# Patient Record
Sex: FEMALE | Race: WHITE
[De-identification: names, ages, dates, MRNs, and addresses within clinical notes are randomized per-mention and may not be internally consistent; named-entity substitution may affect disease eponyms.]

---

## 2018-06-17 ENCOUNTER — HOSPITAL ENCOUNTER (EMERGENCY)
Dept: HOSPITAL 75 - ER | Age: 83
Discharge: HOME | End: 2018-06-17
Payer: MEDICARE

## 2018-06-17 VITALS — HEIGHT: 61 IN | WEIGHT: 120 LBS | BODY MASS INDEX: 22.66 KG/M2

## 2018-06-17 VITALS — DIASTOLIC BLOOD PRESSURE: 89 MMHG | SYSTOLIC BLOOD PRESSURE: 170 MMHG

## 2018-06-17 VITALS — SYSTOLIC BLOOD PRESSURE: 129 MMHG | DIASTOLIC BLOOD PRESSURE: 72 MMHG

## 2018-06-17 DIAGNOSIS — Z79.82: ICD-10-CM

## 2018-06-17 DIAGNOSIS — Z88.4: ICD-10-CM

## 2018-06-17 DIAGNOSIS — N39.0: ICD-10-CM

## 2018-06-17 DIAGNOSIS — G62.9: Primary | ICD-10-CM

## 2018-06-17 DIAGNOSIS — F02.80: ICD-10-CM

## 2018-06-17 DIAGNOSIS — Z66: ICD-10-CM

## 2018-06-17 DIAGNOSIS — Z98.890: ICD-10-CM

## 2018-06-17 DIAGNOSIS — G30.9: ICD-10-CM

## 2018-06-17 LAB
ALBUMIN SERPL-MCNC: 4.2 GM/DL (ref 3.2–4.5)
ALP SERPL-CCNC: 78 U/L (ref 40–136)
ALT SERPL-CCNC: 17 U/L (ref 0–55)
APTT PPP: YELLOW S
BACTERIA #/AREA URNS HPF: (no result) /HPF
BASOPHILS # BLD AUTO: 0 10^3/UL (ref 0–0.1)
BASOPHILS NFR BLD AUTO: 0 % (ref 0–10)
BILIRUB SERPL-MCNC: 0.5 MG/DL (ref 0.1–1)
BILIRUB UR QL STRIP: NEGATIVE
BUN/CREAT SERPL: 37
CALCIUM SERPL-MCNC: 9.4 MG/DL (ref 8.5–10.1)
CHLORIDE SERPL-SCNC: 104 MMOL/L (ref 98–107)
CO2 SERPL-SCNC: 22 MMOL/L (ref 21–32)
CREAT SERPL-MCNC: 0.73 MG/DL (ref 0.6–1.3)
EOSINOPHIL # BLD AUTO: 0.1 10^3/UL (ref 0–0.3)
EOSINOPHIL NFR BLD AUTO: 1 % (ref 0–10)
ERYTHROCYTE [DISTWIDTH] IN BLOOD BY AUTOMATED COUNT: 12.8 % (ref 10–14.5)
FIBRINOGEN PPP-MCNC: (no result) MG/DL
GFR SERPLBLD BASED ON 1.73 SQ M-ARVRAT: > 60 ML/MIN
GLUCOSE SERPL-MCNC: 147 MG/DL (ref 70–105)
GLUCOSE UR STRIP-MCNC: NEGATIVE MG/DL
HCT VFR BLD CALC: 41 % (ref 35–52)
HGB BLD-MCNC: 13.6 G/DL (ref 11.5–16)
INR PPP: 1 (ref 0.8–1.4)
KETONES UR QL STRIP: NEGATIVE
LEUKOCYTE ESTERASE UR QL STRIP: NEGATIVE
LYMPHOCYTES # BLD AUTO: 1.8 X 10^3 (ref 1–4)
LYMPHOCYTES NFR BLD AUTO: 14 % (ref 12–44)
MANUAL DIFFERENTIAL PERFORMED BLD QL: NO
MCH RBC QN AUTO: 31 PG (ref 25–34)
MCHC RBC AUTO-ENTMCNC: 33 G/DL (ref 32–36)
MCV RBC AUTO: 95 FL (ref 80–99)
MONOCYTES # BLD AUTO: 0.7 X 10^3 (ref 0–1)
MONOCYTES NFR BLD AUTO: 5 % (ref 0–12)
NEUTROPHILS # BLD AUTO: 9.8 X 10^3 (ref 1.8–7.8)
NEUTROPHILS NFR BLD AUTO: 79 % (ref 42–75)
NITRITE UR QL STRIP: POSITIVE
PH UR STRIP: 6 [PH] (ref 5–9)
PLATELET # BLD: 265 10^3/UL (ref 130–400)
PMV BLD AUTO: 11 FL (ref 7.4–10.4)
POTASSIUM SERPL-SCNC: 4.2 MMOL/L (ref 3.6–5)
PROT SERPL-MCNC: 7 GM/DL (ref 6.4–8.2)
PROT UR QL STRIP: (no result)
PROTHROMBIN TIME: 12.9 SEC (ref 12.2–14.7)
RBC # BLD AUTO: 4.36 10^6/UL (ref 4.35–5.85)
RBC #/AREA URNS HPF: (no result) /HPF
SODIUM SERPL-SCNC: 138 MMOL/L (ref 135–145)
SP GR UR STRIP: 1.02 (ref 1.02–1.02)
SQUAMOUS #/AREA URNS HPF: (no result) /HPF
UROBILINOGEN UR-MCNC: 1 MG/DL
WBC # BLD AUTO: 12.4 10^3/UL (ref 4.3–11)
WBC #/AREA URNS HPF: (no result) /HPF

## 2018-06-17 PROCEDURE — 70450 CT HEAD/BRAIN W/O DYE: CPT

## 2018-06-17 PROCEDURE — 93005 ELECTROCARDIOGRAM TRACING: CPT

## 2018-06-17 PROCEDURE — 96365 THER/PROPH/DIAG IV INF INIT: CPT

## 2018-06-17 PROCEDURE — 85610 PROTHROMBIN TIME: CPT

## 2018-06-17 PROCEDURE — 80053 COMPREHEN METABOLIC PANEL: CPT

## 2018-06-17 PROCEDURE — 36415 COLL VENOUS BLD VENIPUNCTURE: CPT

## 2018-06-17 PROCEDURE — 85025 COMPLETE CBC W/AUTO DIFF WBC: CPT

## 2018-06-17 PROCEDURE — 82962 GLUCOSE BLOOD TEST: CPT

## 2018-06-17 PROCEDURE — 71045 X-RAY EXAM CHEST 1 VIEW: CPT

## 2018-06-17 PROCEDURE — 84484 ASSAY OF TROPONIN QUANT: CPT

## 2018-06-17 PROCEDURE — 87186 SC STD MICRODIL/AGAR DIL: CPT

## 2018-06-17 PROCEDURE — 87088 URINE BACTERIA CULTURE: CPT

## 2018-06-17 PROCEDURE — 81000 URINALYSIS NONAUTO W/SCOPE: CPT

## 2018-06-17 NOTE — XMS REPORT
Continuity of Care Document

 Created on: 2018



TARSHA FLORES

External Reference #: [id number]

: 1933

Sex: Female



Demographics







 Address  [address] [place]  [address]

 

 Home Phone  [phone] x

 

 Preferred Language  Unknown

 

 Marital Status  Unknown

 

 Roman Catholic Affiliation  Unknown

 

 Race  Unknown

 

 Ethnic Group  Unknown





Author







 Author  [facility]

 

 Organization  [facility]

 

 Address  Unknown

 

 Phone  Unavailable



              



Allergies

      



There is no data.                  



Medications

      



There is no data.                  



Problems

      



There is no data.                  



Procedures

      



There is no data.                  



Results

      



There is no data.              



Encounters

      





 ACCT No.            Visit Date/Time            Discharge            Status    
        Pt. Type            Provider            Facility            Loc./Unit  
          Complaint        

 

 [id number]            2013 15:38:00            2013 23:59:59    
        [facility]            Outpatient

## 2018-06-17 NOTE — DIAGNOSTIC IMAGING REPORT
Indication: Altered mental status, seizure like episode.



Discussion: Single portable supine view of the chest was

obtained. The patient's hand is overlying the right chest which

does limit evaluation. Normal heart size. Elevated right

hemidiaphragm. No focal consolidation, pleural fluid, or

pneumothorax.



Impression:

1. Negative limited chest.



Dictated by: 



  Dictated on workstation # [id number]

## 2018-06-17 NOTE — ED NEUROLOGICAL PROBLEM
General


Chief Complaint:  Neurological Problems


Stated Complaint:  SEIZURE


Nursing Triage Note:  


pt brought in by ems with complaint of possible seizure. per NH pt was in 


wheelchair and started "convulsing". the aid taking care of pt was unsure 


whether it was seizure or not. pt does not have have known hx of seizures.


Nursing Sepsis Screen:  No Definite Risk


Source:  EMS


Exam Limitations:  clinical condition





History of Present Illness


Date Seen by Provider:  Jun 17, 2018


Time Seen by Provider:  06:26


Initial Comments


84-year-old white female presents from nursing home after possible seizure from 

the nursing home.  The nursing home reported the patient is non-verbal and has 

contractures.  The patient's blood pressure was elevated earlier this morning.  

According to the nursing home records the patient has no history of seizure 

disorder.





Patient's family is here and confirms that the patient is a DO NOT RESUSCITATE.

  They want the patient to have comfort care only.  Patient is under the care 

of Dr. FLORES.





Allergies and Home Medications


Allergies


Coded Allergies:  


     NKANo Known Allergies (Verified  Allergy, Unknown, 8/8/08)


     midazolam (Unverified  Allergy, Unknown, 1/22/11)





Home Medications


Acetaminophen 325 Mg Tablet, 1-2 TAB PO PRN, (Reported)


   PAIN OR FEVER 


Aspirin 81 Mg Tabec, 81 MG PO DAILY, (Reported)


Calcitonin 3.7 Ml [place].pump, 1 SPRAY NS DAILY, (Reported)


Cholecalciferol 2,000 Unit Capsule, 2,000 UNIT PO DAILY, (Reported)


Dipyridamole/Aspirin 1 Ea Cap, 1 EA PO DAILY, (Reported)


Docusate Sodium 100 Mg Capsule, 100 MG PO HS, (Reported)


Levothyroxine Sodium 25 Mcg Tablet, 1 TAB PO DAILY, (Reported)


Levothyroxine Sodium 50 Mcg Tablet, 1 TAB PO DAILY, (Reported)


Metoclopramide Hcl 5 Mg Tablet, 5 MG PO ACHS, (Reported)


Omeprazole 20 Mg Capsule.dr, 20 MG PO DAILY, (Reported)


Potassium Chloride 8 Meq Capsule.sa, 8 MEQ PO DAILY, (Reported)


Senna 1 Ea Tablet, 1 EA PO BID, (Reported)


Simvastatin 20 Mg Tablet, 20 MG PO DAILY, (Reported)


Sitagliptin Phosphate 100 Mg Tablet, 1 EACH PO DAILY, (Reported)


Tramadol Hcl 50 Mg Tablet, 50 MG PO TID, (Reported)





Patient Home Medication List


Home Medication List Reviewed:  Yes





Review of Systems


Constitutional:  no symptoms reported


Eyes:  No Symptoms Reported


Ears, Nose, Mouth, Throat:  no symptoms reported


Respiratory:  no symptoms reported


Cardiovascular:  no symptoms reported


Genitourinary:  no symptoms reported


Pregnant:  No


Musculoskeletal:  no symptoms reported, other (chronic contractures)


Skin:  no symptoms reported


Psychiatric/Neurological:  See HPI, Other (previous strokes)


Endocrine:  No Symptoms Reported


Hematologic/Lymphatic:  No Symptoms Reported





Past Medical-Social-Family Hx


Past Med/Social Hx:  Reviewed Nursing Past Med/Soc Hx


Patient Social History


Alcohol Use:  Denies Use


Recreational Drug Use:  No


Smoking Status:  Never a Smoker


Recent Foreign Travel:  No


Contact w/Someone Who Travel:  No


Recent Infectious Disease Expo:  No


Recent Hopitalizations:  Yes





Immunizations Up To Date


Tetanus Booster (TDap):  Unknown


PED Vaccines UTD:  Yes


Date of Pneumonia Vaccine:  Oct 29, 2008


Date of Influenza Vaccine:  Oct 29, 2011





Past Medical History


Surgeries:  Yes


Respiratory:  No


Cardiac:  No


Neurological:  Yes


Reproductive Disorders:  No


Gastrointestinal:  No


Musculoskeletal:  Yes (INGUINAL HERNIA REPAIR RIGHT SIDE)


Endocrine:  Yes


Psychosocial:  Yes (ALZHIEMERS)


Blood Disorders:  No





Physical Exam


Vital Signs





Vital Signs - First Documented








 6/17/18





 06:10


 


Temp 98.5


 


Pulse 88


 


Resp 20


 


B/P (MAP) 107/


 


Pulse Ox 96


 


O2 Delivery Room Air


 


O2 Flow Rate 150.00





Capillary Refill : Less Than 3 Seconds


General Appearance:  no apparent distress, other (the patient is nonverbal and 

has contractures from her previous strokes.)


HEENT:  other (there may be some old dried blood on the lips.  I was not 

possible to do an adequate oral exam on the patient.  No active bleeding was 

noted during her evaluation in the emergency department.)


Neck:  other (the patient's neck is nontender but it is stiff as is the rest of 

her back and extremities from contractures.)


Respiratory:  lungs clear, normal breath sounds


Cardiovascular:  normal peripheral pulses, regular rate, rhythm


Gastrointestinal:  normal bowel sounds, non tender, soft


Genital/Rectal:  other (there is a normal external genital exam.  Catheter 

urine was obtained which was cloudy.  The rectal exam was normal.  The patient'

s rectal exam was normal.)


Back:  other (2 small bruises were noted over the posterior pelvis over the SI 

joints.  These were old in approximately the size of quarters.)


Extremities:  other (4 extremity contractures)


Neurologic/Psychiatric:  other (patient's neurological exam was very limited.  

However the patient was awake and responded to verbal stimuli.  She was 

nonverbal.)


Skin:  normal color, warm/dry





Progress/Results/Core Measures


Results/Orders


Lab Results





Laboratory Tests








Test


 6/17/18


06:19 6/17/18


06:52 Range/Units


 


 


Glucometer 134 H    MG/DL


 


White Blood Count


 


 12.4 H


 4.3-11.0


10^3/uL


 


Red Blood Count


 


 4.36 


 4.35-5.85


10^6/uL


 


Hemoglobin  13.6  11.5-16.0  G/DL


 


Hematocrit  41  35-52  %


 


Mean Corpuscular Volume  95  80-99  FL


 


Mean Corpuscular Hemoglobin  31  25-34  PG


 


Mean Corpuscular Hemoglobin


Concent 


 33 


 32-36  G/DL





 


Red Cell Distribution Width  12.8  10.0-14.5  %


 


Platelet Count


 


 265 


 130-400


10^3/uL


 


Mean Platelet Volume  11.0 H 7.4-10.4  FL


 


Neutrophils (%) (Auto)  79 H 42-75  %


 


Lymphocytes (%) (Auto)  14  12-44  %


 


Monocytes (%) (Auto)  5  0-12  %


 


Eosinophils (%) (Auto)  1  0-10  %


 


Basophils (%) (Auto)  0  0-10  %


 


Neutrophils # (Auto)  9.8 H 1.8-7.8  X 10^3


 


Lymphocytes # (Auto)  1.8  1.0-4.0  X 10^3


 


Monocytes # (Auto)  0.7  0.0-1.0  X 10^3


 


Eosinophils # (Auto)


 


 0.1 


 0.0-0.3


10^3/uL


 


Basophils # (Auto)


 


 0.0 


 0.0-0.1


10^3/uL


 


Prothrombin Time  12.9  12.2-14.7  SEC


 


INR Comment  1.0  0.8-1.4  


 


Urine Color  YELLOW   


 


Urine Clarity  VERY CLOUDY H  


 


Urine pH  6  5-9  


 


Urine Specific Gravity  1.025 H 1.016-1.022  


 


Urine Protein  2+ H NEGATIVE  


 


Urine Glucose (UA)  NEGATIVE  NEGATIVE  


 


Urine Ketones  NEGATIVE  NEGATIVE  


 


Urine Nitrite  POSITIVE H NEGATIVE  


 


Urine Bilirubin  NEGATIVE  NEGATIVE  


 


Urine Urobilinogen  1  NORMAL  MG/DL


 


Urine Leukocyte Esterase  NEGATIVE  NEGATIVE  


 


Urine RBC (Auto)  3+ H NEGATIVE  


 


Urine RBC  RARE   /HPF


 


Urine WBC  RARE   /HPF


 


Urine Squamous Epithelial


Cells 


 5-10 


  /HPF





 


Urine Crystals  NONE   /LPF


 


Urine Bacteria  LARGE H  /HPF


 


Urine Casts  NONE   /LPF


 


Urine Mucus  NEGATIVE   /LPF


 


Urine Culture Indicated  YES   


 


Sodium Level  138  135-145  MMOL/L


 


Potassium Level  4.2  3.6-5.0  MMOL/L


 


Chloride Level  104    MMOL/L


 


Carbon Dioxide Level  22  21-32  MMOL/L


 


Anion Gap  12  5-14  MMOL/L


 


Blood Urea Nitrogen  27 H 7-18  MG/DL


 


Creatinine


 


 0.73 


 0.60-1.30


MG/DL


 


Estimat Glomerular Filtration


Rate 


 > 60 


  





 


BUN/Creatinine Ratio  37   


 


Glucose Level  147 H   MG/DL


 


Calcium Level  9.4  8.5-10.1  MG/DL


 


Total Bilirubin  0.5  0.1-1.0  MG/DL


 


Aspartate Amino Transf


(AST/SGOT) 


 22 


 5-34  U/L





 


Alanine Aminotransferase


(ALT/SGPT) 


 17 


 0-55  U/L





 


Alkaline Phosphatase  78    U/L


 


Troponin I  < 0.30  <0.30  NG/ML


 


Total Protein  7.0  6.4-8.2  GM/DL


 


Albumin  4.2  3.2-4.5  GM/DL








My Orders





Orders - YAQUELIN MELGAR MD


Ct Head Wo (6/17/18 06:22)


Cbc With Automated Diff (6/17/18 06:22)


Comprehensive Metabolic Panel (6/17/18 06:22)


Ua Culture If Indicated (6/17/18 06:22)


Protime With Inr (6/17/18 06:22)


Ekg Tracing (6/17/18 06:22)


Troponin I (6/17/18 06:22)


Midazolam Injection (Versed Injection) (6/17/18 06:49)


Chest 1 View, Ap/Pa Only (6/17/18 06:50)


Urine Culture (6/17/18 06:52)


Ceftriaxone Injection (Rocephin Injectio (6/17/18 08:45)





Vital Signs/I&O











 6/17/18 6/17/18





 06:10 08:02


 


Temp 98.5 


 


Pulse 88 77


 


Resp 20 18


 


B/P (MAP) 107/ 170/89 (116)


 


Pulse Ox 96 97


 


O2 Delivery Room Air Room Air


 


O2 Flow Rate 150.00 











Progress


Progress Note :  


   Time:  08:48


Progress Note


The patient's workup in the emergency department demonstrated an unremarkable 

CT for acute pathology.  The patient's chest x-ray was grossly normal.  Patient'

s laboratory evaluation demonstrated evidence of an acute UTI.





I discussed treatment options with the family.  It was their wish the patient 

be allowed to return to the nursing home and she will be more comfortable there.





2 g of Rocephin were given IV.  I'll write a prescription for Macrobid for the 

patient.





The patient has recently started Ultram for pain.  I recommended we stop the 

Ultram until Dr. FLORES had a chance to consider whether to continue it.





Departure


Impression





 Primary Impression:  


 UTI (urinary tract infection)


 Qualified Codes:  N30.00 - Acute cystitis without hematuria


 Additional Impression:  


 Seizure


Disposition:  01 HOME, SELF-CARE


Condition:  Improved





Departure-Patient Inst.


Decision time for Depature:  08:52


Referrals:  


FARRAH FLORES DO (PCP/Family)


Primary Care Physician


Patient Instructions:  Acute Cystitis (DC), Epilepsy in Adults





Add. Discharge Instructions:  


Macrobid as prescribed.  Close follow-up Dr. Flores.  Stop Ultram until 

cleared by Dr. Flores.  Return if any problems.  All discharge instructions 

reviewed with patient and/or family. Voiced understanding.











YAQUELIN MELGAR MD Jun 17, 2018 06:49

## 2018-06-17 NOTE — DIAGNOSTIC IMAGING REPORT
PROCEDURE: CT head without contrast.



TECHNIQUE: Multiple contiguous axial images were obtained through

the brain without the use of intravenous contrast.



INDICATION:  Seizure



COMPARISON: 01/20/2011



FINDINGS: The ventricles and cortical sulci are diffusely

prominent. There is no midline shift or mass effect identified.

There is no extra axial or intraparenchymal hemorrhage seen.

Focal areas of decreased attenuation are seen in the subcortical

and periventricular white matter. These likely represent chronic

small vessel ischemic changes. Small lacunar infarct is noted in

the right basal ganglia. No CT evidence of acute territorial

ischemia seen.



The bony calvarium is intact. The paranasal sinuses are clear. 



IMPRESSION:

1. No acute intracranial hemorrhage. No CT evidence of acute

territorial ischemia.

2. Findings of chronic small vessel ischemic disease.  

3. Marked generalized parenchymal volume loss.



Dictated by: 



  Dictated on workstation # [id number]

## 2018-06-19 ENCOUNTER — HOSPITAL ENCOUNTER (EMERGENCY)
Dept: HOSPITAL 75 - ER | Age: 83
Discharge: HOME | End: 2018-06-19
Payer: MEDICARE

## 2018-06-19 VITALS — DIASTOLIC BLOOD PRESSURE: 77 MMHG | SYSTOLIC BLOOD PRESSURE: 142 MMHG

## 2018-06-19 VITALS — WEIGHT: 120.03 LBS | HEIGHT: 61 IN | BODY MASS INDEX: 22.66 KG/M2

## 2018-06-19 DIAGNOSIS — K05.6: ICD-10-CM

## 2018-06-19 DIAGNOSIS — R45.1: Primary | ICD-10-CM

## 2018-06-19 DIAGNOSIS — F02.80: ICD-10-CM

## 2018-06-19 DIAGNOSIS — Z88.4: ICD-10-CM

## 2018-06-19 DIAGNOSIS — G30.9: ICD-10-CM

## 2018-06-19 DIAGNOSIS — Z87.19: ICD-10-CM

## 2018-06-19 DIAGNOSIS — Z79.82: ICD-10-CM

## 2018-06-19 LAB
BASOPHILS # BLD AUTO: 0 10^3/UL (ref 0–0.1)
BASOPHILS NFR BLD AUTO: 0 % (ref 0–10)
BUN/CREAT SERPL: 24
CALCIUM SERPL-MCNC: 9.5 MG/DL (ref 8.5–10.1)
CHLORIDE SERPL-SCNC: 108 MMOL/L (ref 98–107)
CO2 SERPL-SCNC: 23 MMOL/L (ref 21–32)
CREAT SERPL-MCNC: 0.74 MG/DL (ref 0.6–1.3)
EOSINOPHIL # BLD AUTO: 0.1 10^3/UL (ref 0–0.3)
EOSINOPHIL NFR BLD AUTO: 1 % (ref 0–10)
ERYTHROCYTE [DISTWIDTH] IN BLOOD BY AUTOMATED COUNT: 12.7 % (ref 10–14.5)
GFR SERPLBLD BASED ON 1.73 SQ M-ARVRAT: > 60 ML/MIN
GLUCOSE SERPL-MCNC: 138 MG/DL (ref 70–105)
HCT VFR BLD CALC: 39 % (ref 35–52)
HGB BLD-MCNC: 12.8 G/DL (ref 11.5–16)
LYMPHOCYTES # BLD AUTO: 1.3 X 10^3 (ref 1–4)
LYMPHOCYTES NFR BLD AUTO: 12 % (ref 12–44)
MANUAL DIFFERENTIAL PERFORMED BLD QL: NO
MCH RBC QN AUTO: 31 PG (ref 25–34)
MCHC RBC AUTO-ENTMCNC: 33 G/DL (ref 32–36)
MCV RBC AUTO: 93 FL (ref 80–99)
MONOCYTES # BLD AUTO: 1.2 X 10^3 (ref 0–1)
MONOCYTES NFR BLD AUTO: 10 % (ref 0–12)
NEUTROPHILS # BLD AUTO: 8.9 X 10^3 (ref 1.8–7.8)
NEUTROPHILS NFR BLD AUTO: 78 % (ref 42–75)
PLATELET # BLD: 211 10^3/UL (ref 130–400)
PMV BLD AUTO: 11.2 FL (ref 7.4–10.4)
POTASSIUM SERPL-SCNC: 3.7 MMOL/L (ref 3.6–5)
RBC # BLD AUTO: 4.12 10^6/UL (ref 4.35–5.85)
SODIUM SERPL-SCNC: 142 MMOL/L (ref 135–145)
T4 FREE SERPL-MCNC: 1.32 NG/DL (ref 0.7–1.48)
WBC # BLD AUTO: 11.5 10^3/UL (ref 4.3–11)

## 2018-06-19 PROCEDURE — 84439 ASSAY OF FREE THYROXINE: CPT

## 2018-06-19 PROCEDURE — 85025 COMPLETE CBC W/AUTO DIFF WBC: CPT

## 2018-06-19 PROCEDURE — 84443 ASSAY THYROID STIM HORMONE: CPT

## 2018-06-19 PROCEDURE — 36415 COLL VENOUS BLD VENIPUNCTURE: CPT

## 2018-06-19 PROCEDURE — 80048 BASIC METABOLIC PNL TOTAL CA: CPT

## 2018-06-19 PROCEDURE — 96374 THER/PROPH/DIAG INJ IV PUSH: CPT

## 2018-06-19 NOTE — XMS REPORT
Continuity of Care Document

 Created on: 2018



TARSHA FLORES

External Reference #: [id number]

: 1933

Sex: Female



Demographics







 Address  [address] [place]  [address]

 

 Home Phone  [phone] x

 

 Preferred Language  Unknown

 

 Marital Status  Unknown

 

 Congregational Affiliation  Unknown

 

 Race  Unknown

 

 Ethnic Group  Unknown





Author







 Author  [facility]

 

 Organization  [facility]

 

 Address  Unknown

 

 Phone  Unavailable



              



Allergies

      



There is no data.                  



Medications

      



There is no data.                  



Problems

      



There is no data.                  



Procedures

      



There is no data.                  



Results

      





 Test            Result            Range        









 Capillary blood glucose measurement by glucometer (mass/volume) - 18 06:
19         









 Capillary blood glucose measurement by glucometer (mass/volume)            134 
mg/dL                    









 Complete blood count (CBC) with automated white blood cell (WBC) differential 
- 18 06:52         









 Blood leukocytes automated count (number/volume)            12.4 10*3/uL      
      4.3-11.0        

 

 Blood erythrocytes automated count (number/volume)            4.36 10*6/uL    
        4.35-5.85        

 

 Venous blood hemoglobin measurement (mass/volume)            13.6 g/dL        
    11.5-16.0        

 

 Blood hematocrit (volume fraction)            41 %            35-52        

 

 Automated erythrocyte mean corpuscular volume            95 [foz_us]          
  80-99        

 

 Automated erythrocyte mean corpuscular hemoglobin (mass per erythrocyte)      
      31 pg            25-34        

 

 Automated erythrocyte mean corpuscular hemoglobin concentration measurement (
mass/volume)            33 g/dL            32-36        

 

 Automated erythrocyte distribution width ratio            12.8 %            
10.0-14.5        

 

 Automated blood platelet count (count/volume)            265 10*3/uL          
  130-400        

 

 Automated blood platelet mean volume measurement            11.0 [foz_us]     
       7.4-10.4        

 

 Automated blood neutrophils/100 leukocytes            79 %            42-75   
     

 

 Automated blood lymphocytes/100 leukocytes            14 %            12-44   
     

 

 Blood monocytes/100 leukocytes            5 %            0-12        

 

 Automated blood eosinophils/100 leukocytes            1 %            0-10     
   

 

 Automated blood basophils/100 leukocytes            0 %            0-10        

 

 Blood neutrophils automated count (number/volume)            9.8 10*3         
   1.8-7.8        

 

 Blood lymphocytes automated count (number/volume)            1.8 10*3         
   1.0-4.0        

 

 Blood monocytes automated count (number/volume)            0.7 10*3            
0.0-1.0        

 

 Automated eosinophil count            0.1 10*3/uL            0.0-0.3        

 

 Automated blood basophil count (count/volume)            0.0 10*3/uL          
  0.0-0.1        









 Comprehensive metabolic panel - 18 06:52         









 Serum or plasma sodium measurement (moles/volume)            138 mmol/L       
     135-145        

 

 Serum or plasma potassium measurement (moles/volume)            4.2 mmol/L    
        3.6-5.0        

 

 Serum or plasma chloride measurement (moles/volume)            104 mmol/L     
               

 

 Carbon dioxide            22 mmol/L            21-32        

 

 Serum or plasma anion gap determination (moles/volume)            12 mmol/L   
         5-14        

 

 Serum or plasma urea nitrogen measurement (mass/volume)            27 mg/dL   
         7-18        

 

 Serum or plasma creatinine measurement (mass/volume)            0.73 mg/dL    
        0.60-1.30        

 

 Serum or plasma urea nitrogen/creatinine mass ratio            37             
NRG        

 

 Serum or plasma creatinine measurement with calculation of estimated 
glomerular filtration rate            >             NRG        

 

 Serum or plasma glucose measurement (mass/volume)            147 mg/dL        
            

 

 Serum or plasma calcium measurement (mass/volume)            9.4 mg/dL        
    8.5-10.1        

 

 Serum or plasma total bilirubin measurement (mass/volume)            0.5 mg/dL
            0.1-1.0        

 

 Serum or plasma alkaline phosphatase measurement (enzymatic activity/volume)  
          78 U/L                    

 

 Serum or plasma aspartate aminotransferase measurement (enzymatic activity/
volume)            22 U/L            5-34        

 

 Serum or plasma alanine aminotransferase measurement (enzymatic activity/volume
)            17 U/L            0-55        

 

 Serum or plasma protein measurement (mass/volume)            7.0 g/dL         
   6.4-8.2        

 

 Serum or plasma albumin measurement (mass/volume)            4.2 g/dL         
   3.2-4.5        









 Serum or plasma troponin i.cardiac measurement (mass/volume) - 18 06:52 
        









 Serum or plasma troponin i.cardiac measurement (mass/volume)            < ng/
mL            <0.30        









 Complete urinalysis with reflex to culture - 18 06:52         









 Urine color determination            YELLOW             NRG        

 

 Urine clarity determination            VERY CLOUDY             NRG        

 

 Urine pH measurement by test strip            6             5-9        

 

 Specific gravity of urine by test strip            1.025             1.016-
1.022        

 

 Urine protein assay by test strip, semi-quantitative            2+             
NEGATIVE        

 

 Urine glucose detection by automated test strip            NEGATIVE           
  NEGATIVE        

 

 Erythrocytes detection in urine sediment by light microscopy            3+    
         NEGATIVE        

 

 Urine ketones detection by automated test strip            NEGATIVE           
  NEGATIVE        

 

 Urine nitrite detection by test strip            POSITIVE             NEGATIVE
        

 

 Urine total bilirubin detection by test strip            NEGATIVE             
NEGATIVE        

 

 Urine urobilinogen measurement by automated test strip (mass/volume)          
  1 mg/dL            NORMAL        

 

 Urine leukocyte esterase detection by dipstick            NEGATIVE             
NEGATIVE        

 

 Automated urine sediment erythrocyte count by microscopy (number/high power 
field)            RARE             NRG        

 

 Automated urine sediment leukocyte count by microscopy (number/high power field
)            RARE             NRG        

 

 Bacteria detection in urine sediment by light microscopy            LARGE     
        NRG        

 

 Squamous epithelial cells detection in urine sediment by light microscopy     
       5-10             NRG        

 

 Crystals detection in urine sediment by light microscopy            NONE      
       NRG        

 

 Casts detection in urine sediment by light microscopy            NONE         
    NRG        

 

 Mucus detection in urine sediment by light microscopy            NEGATIVE     
        NRG        

 

 Complete urinalysis with reflex to culture            YES             NRG     
   









 PT panel in platelet poor plasma by coagulation assay - 18 06:52         









 Prothrombin time (PT) in platelet poor plasma by coagulation assay            
12.9 s            12.2-14.7        

 

 INR in platelet poor plasma or blood by coagulation assay            1.0      
       0.8-1.4        









 Bacterial urine culture - 18 06:52         









 Bacterial urine culture            [id number]             NRG        

 

 COLONY COUNT            >100,000/ML             NRG        

 

 FTX;REPORTABLE            REPORTED BY [facility] 18 14:05             NRG        



                            



Encounters

      





 ACCT No.            Visit Date/Time            Discharge            Status    
        Pt. Type            Provider            Facility            Loc./Unit  
          Complaint        

 

 [id number]            2013 15:38:00            2013 23:59:59    
        [facility]            Outpatient                                              
              

 

 [id number]            2018 06:27:00                                   
   Document Registration

## 2018-06-19 NOTE — ED GENERAL
General


Chief Complaint:  General Problems/Pain


Stated Complaint:  AMS-SQUIRMING


Nursing Triage Note:  


PT ARRIVED PER EMS PT SENT FROM VIA [facility] OF [facility], PT IS 


AWAKE BUT NON VERBAL. PT DOES HAVE FREQUENT MOVEMENT OF ARMS AND LEGS. PT NON 


TENDER OF ABD ON LT PALPATION. PT DOES HAVE SOME GRIMACING. PT IS CURRENTLY 


BEING TREATED FOR UTI


Nursing Sepsis Screen:  No Definite Risk


Source of Information:  Patient, Old Records


Exam Limitations:  Physical Impairments





History of Present Illness


Date Seen by Provider:  Jun 19, 2018


Time Seen by Provider:  09:41


Initial Comments


This 84-year-old woman is brought to the emergency room via EMS from the 

nursing home.  She has severe advanced dementia and has appeared agitated or 

possibly in pain today.  Vital signs are within normal limits.  Patient was 

also seen on June 17 for similar symptoms.  She was thought to have had a 

seizure on that day.  She had been taking some tramadol and seizure was thought 

to have been possibly precipitated by tramadol use.  Patient is almost entirely 

non-verbal.  Family and friends are uncertain of what might be causing her 

agitation.  There was no known trauma.  She has bruising on her forearms which 

is believed to be related to her prior IV.  Patient's daughters arrived to 

support the patient.  They assert that above all they want the patient to be 

comfortable and wanted to avoid aggressive workups and therapies given her 

advanced dementia.





Allergies and Home Medications


Allergies


Coded Allergies:  


     NKANo Known Allergies (Verified  Allergy, Unknown, 8/8/08)


     midazolam (Unverified  Allergy, Unknown, 1/22/11)





Home Medications


Acetaminophen 325 Mg Tablet, 1-2 TAB PO PRN, (Reported)


   PAIN OR FEVER 


Amoxicillin 400 Mg/5 Ml Susp.recon, 12.5 ML PO BID


   Prescribed by: SONJA DICK on 6/19/18 1223


Aspirin 81 Mg Tabec, 81 MG PO DAILY, (Reported)


Calcitonin 3.7 Ml Gleason.pump, 1 SPRAY NS DAILY, (Reported)


Cholecalciferol 2,000 Unit Capsule, 2,000 UNIT PO DAILY, (Reported)


Dipyridamole/Aspirin 1 Ea Cap, 1 EA PO DAILY, (Reported)


Docusate Sodium 100 Mg Capsule, 100 MG PO HS, (Reported)


Levothyroxine Sodium 25 Mcg Tablet, 1 TAB PO DAILY, (Reported)


Levothyroxine Sodium 50 Mcg Tablet, 1 TAB PO DAILY, (Reported)


Metoclopramide Hcl 5 Mg Tablet, 5 MG PO ACHS, (Reported)


Omeprazole 20 Mg Capsule.dr, 20 MG PO DAILY, (Reported)


Oxycodone HCl 5 Mg/5 Ml Solution, 2.5-5 MG PO Q6H PRN for PAIN-MODERATE TO 

SEVERE


   Prescribed by: SONJA DICK on 6/19/18 1223


Potassium Chloride 8 Meq Capsule.sa, 8 MEQ PO DAILY, (Reported)


Senna 1 Ea Tablet, 1 EA PO BID, (Reported)


Simvastatin 20 Mg Tablet, 20 MG PO DAILY, (Reported)


Sitagliptin Phosphate 100 Mg Tablet, 1 EACH PO DAILY, (Reported)


Tramadol Hcl 50 Mg Tablet, 50 MG PO TID, (Reported)





Patient Home Medication List


Home Medication List Reviewed:  Yes





Review of Systems


Constitutional:  see HPI


EENTM:  other (periodontal disease)


Respiratory:  no symptoms reported


Cardiovascular:  no symptoms reported


Gastrointestinal:  no symptoms reported


Genitourinary:  no symptoms reported


Musculoskeletal:  see HPI


Skin:  see HPI


Psychiatric/Neurological:  See HPI


Hematologic/Lymphatic:  No Symptoms Reported


Immunological/Allergic:  no symptoms reported





Past Medical-Social-Family Hx


Patient Social History


Alcohol Use:  Denies Use


Recreational Drug Use:  No


Smoking Status:  Never a Smoker


Recent Foreign Travel:  No


Contact w/Someone Who Travel:  No


Recent Infectious Disease Expo:  No


Recent Hopitalizations:  Yes


Physical Abuse:  No


Sexual Abuse:  No





Immunizations Up To Date


Tetanus Booster (TDap):  Unknown


PED Vaccines UTD:  Yes


Date of Pneumonia Vaccine:  Oct 29, 2008


Date of Influenza Vaccine:  Oct 29, 2011





Past Medical History


Surgeries:  Yes


Respiratory:  No


Cardiac:  No


Neurological:  Yes


Dementia


Reproductive Disorders:  No


Gastrointestinal:  No


Musculoskeletal:  Yes (INGUINAL HERNIA REPAIR RIGHT SIDE)


Endocrine:  Yes


Cancer:  No


Psychosocial:  Yes (ALZHIEMERS)


Nursing Suicide Risk Score:  0


Blood Disorders:  No





Physical Exam


Vital Signs





Vital Signs - First Documented








 6/19/18





 09:40


 


Temp 97.4


 


Pulse 77


 


Resp 18


 


B/P (MAP) 142/77 (98)


 


Pulse Ox 97


 


O2 Delivery Room Air





Capillary Refill : Less Than 3 Seconds


General Appearance:  WD/WN, Mild Distress


HEENT:  PERRL/EOMI, Normal ENT Inspection, Other (poor dentition.  Evidence of 

inflammatory changes around the gingiva and under the tongue.  There is a 

purplish color to the tongue suspicious for ecchymosis)


Neck:  Normal Inspection


Respiratory:  Lungs Clear, Normal Breath Sounds, No Accessory Muscle Use, No 

Respiratory Distress


Cardiovascular:  Regular Rate, Rhythm, No Edema, No Murmur


Gastrointestinal:  Normal Bowel Sounds, Non Tender, Soft


Extremity:  Normal Inspection, No Pedal Edema


Neurologic/Psychiatric:  Alert, Oriented x3, No Motor/Sensory Deficits, Normal 

Mood/Affect





Progress/Results/Core Measures


Suspected Sepsis


Recent Fever Within 48 Hours:  No


Infection Criteria Present:  None


New/Unexplained  Altered Menta:  No


Sepsis Screen:  No Definite Risk


SIRS


Temperature:97.4 


Pulse: 77 


Respiratory Rate: 18


 


Laboratory Tests


6/19/18 10:32: White Blood Count 11.5H


Blood Pressure 142 /77 


Mean: 98


 


Laboratory Tests


6/19/18 10:32: 


Creatinine 0.74, Platelet Count 211








Results/Orders


Lab Results





Laboratory Tests








Test


 6/19/18


10:32 Range/Units


 


 


White Blood Count


 11.5 H


 4.3-11.0


10^3/uL


 


Red Blood Count


 4.12 L


 4.35-5.85


10^6/uL


 


Hemoglobin 12.8  11.5-16.0  G/DL


 


Hematocrit 39  35-52  %


 


Mean Corpuscular Volume 93  80-99  FL


 


Mean Corpuscular Hemoglobin 31  25-34  PG


 


Mean Corpuscular Hemoglobin


Concent 33 


 32-36  G/DL





 


Red Cell Distribution Width 12.7  10.0-14.5  %


 


Platelet Count


 211 


 130-400


10^3/uL


 


Mean Platelet Volume 11.2 H 7.4-10.4  FL


 


Neutrophils (%) (Auto) 78 H 42-75  %


 


Lymphocytes (%) (Auto) 12  12-44  %


 


Monocytes (%) (Auto) 10  0-12  %


 


Eosinophils (%) (Auto) 1  0-10  %


 


Basophils (%) (Auto) 0  0-10  %


 


Neutrophils # (Auto) 8.9 H 1.8-7.8  X 10^3


 


Lymphocytes # (Auto) 1.3  1.0-4.0  X 10^3


 


Monocytes # (Auto) 1.2 H 0.0-1.0  X 10^3


 


Eosinophils # (Auto)


 0.1 


 0.0-0.3


10^3/uL


 


Basophils # (Auto)


 0.0 


 0.0-0.1


10^3/uL


 


Sodium Level 142  135-145  MMOL/L


 


Potassium Level 3.7  3.6-5.0  MMOL/L


 


Chloride Level 108 H   MMOL/L


 


Carbon Dioxide Level 23  21-32  MMOL/L


 


Anion Gap 11  5-14  MMOL/L


 


Blood Urea Nitrogen 18  7-18  MG/DL


 


Creatinine


 0.74 


 0.60-1.30


MG/DL


 


Estimat Glomerular Filtration


Rate > 60 


  





 


BUN/Creatinine Ratio 24   


 


Glucose Level 138 H   MG/DL


 


Calcium Level 9.5  8.5-10.1  MG/DL


 


Thyroid Stimulating Hormone


(TSH) 3.09 


 0.35-4.94


UIU/ML


 


Free Thyroxine


 1.32 


 0.70-1.48


NG/DL








My Orders





Orders - SONJA MCLEAN MD


Basic Metabolic Panel (6/19/18 09:52)


Cbc With Automated Diff (6/19/18 09:52)


Thyroid Stimulating Hormone (6/19/18 09:52)


Free T4 (Free Thyroxine) (6/19/18 09:52)


Saline Lock/Iv-Start (6/19/18 09:52)


Fentanyl  Injection (Sublimaze Injection (6/19/18 11:45)





Medications Given in ED





Current Medications








 Medications  Dose


 Ordered  Sig/Cody


 Route  Start Time


 Stop Time Status Last Admin


Dose Admin


 


 Fentanyl Citrate  25 mcg  ONCE  ONCE


 IVP  6/19/18 11:45


 6/19/18 11:46 DC 6/19/18 11:50


25 MCG








Vital Signs/I&O











 6/19/18





 12:29


 


Pulse 75


 


Resp 18


 


B/P (MAP) 142/77 (98)


 


Pulse Ox 100





Capillary Refill : Less Than 3 Seconds








Blood Pressure Mean:  98








Progress Note :  


Progress Note


At family's request patient was treated with fentanyl.  She immediately was 

able to rest and sleep.  Vital signs remained stable.  A source of her 

agitation was ultimately not identified.  Urine culture from prior urine 

specimen was not yet reported out.  Patient was suspected of having discomfort 

from periodontal disease.  As a next course of action family wanted to try 

treating with amoxicillin.  They also requested medication to control her pain 

and make her comfortable.  The liquid oxycodone was selected.





Departure


Impression





 Primary Impression:  


 Agitation


 Additional Impression:  


 Periodontal disease


Disposition:  01 HOME, SELF-CARE


Condition:  Improved





Departure-Patient Inst.


Decision time for Depature:  12:00


Referrals:  


FARRAH FLORES DO (PCP/Family)


Primary Care Physician


Patient Instructions:  Periodontal Disease





Add. Discharge Instructions:  


Completely antibiotics as prescribed.  You may use oxycodone as prescribed for 

moderate to severe pain.  Follow-up with Dr. Flores as soon as possible.  

Consider discussing hospice as a means of managing symptoms.  You may return to 

care if symptoms are worsening.














All discharge instructions reviewed with patient and/or family. Voiced 

understanding.


Scripts


Amoxicillin (Amoxicillin) 400 Mg/5 Ml Susp.recon


12.5 ML PO BID, #250 ML


   Prov: SONJA MCLEAN MD         6/19/18 


Oxycodone HCl (Oxycodone HCl) 5 Mg/5 Ml Solution


2.5-5 MG PO Q6H PRN for PAIN-MODERATE TO SEVERE, #30 EA


   Prov: SONJA MCLEAN MD         6/19/18





Copy


Copies To 1:   FARRAH FLORES JOSHUA T MD Jun 19, 2018 12:23